# Patient Record
Sex: MALE | Race: ASIAN | NOT HISPANIC OR LATINO
[De-identification: names, ages, dates, MRNs, and addresses within clinical notes are randomized per-mention and may not be internally consistent; named-entity substitution may affect disease eponyms.]

---

## 2024-01-10 ENCOUNTER — APPOINTMENT (OUTPATIENT)
Dept: INTERNAL MEDICINE | Facility: CLINIC | Age: 44
End: 2024-01-10
Payer: COMMERCIAL

## 2024-01-10 ENCOUNTER — NON-APPOINTMENT (OUTPATIENT)
Age: 44
End: 2024-01-10

## 2024-01-10 ENCOUNTER — LABORATORY RESULT (OUTPATIENT)
Age: 44
End: 2024-01-10

## 2024-01-10 VITALS
WEIGHT: 184 LBS | HEART RATE: 89 BPM | HEIGHT: 71 IN | DIASTOLIC BLOOD PRESSURE: 80 MMHG | BODY MASS INDEX: 25.76 KG/M2 | OXYGEN SATURATION: 98 % | SYSTOLIC BLOOD PRESSURE: 120 MMHG

## 2024-01-10 DIAGNOSIS — Z23 ENCOUNTER FOR IMMUNIZATION: ICD-10-CM

## 2024-01-10 DIAGNOSIS — Z83.2 FAMILY HISTORY OF DISEASES OF THE BLOOD AND BLOOD-FORMING ORGANS AND CERTAIN DISORDERS INVOLVING THE IMMUNE MECHANISM: ICD-10-CM

## 2024-01-10 DIAGNOSIS — Z78.9 OTHER SPECIFIED HEALTH STATUS: ICD-10-CM

## 2024-01-10 DIAGNOSIS — Z00.00 ENCOUNTER FOR GENERAL ADULT MEDICAL EXAMINATION W/OUT ABNORMAL FINDINGS: ICD-10-CM

## 2024-01-10 DIAGNOSIS — Z12.12 ENCOUNTER FOR SCREENING FOR MALIGNANT NEOPLASM OF COLON: ICD-10-CM

## 2024-01-10 DIAGNOSIS — Z80.0 FAMILY HISTORY OF MALIGNANT NEOPLASM OF DIGESTIVE ORGANS: ICD-10-CM

## 2024-01-10 DIAGNOSIS — Z12.11 ENCOUNTER FOR SCREENING FOR MALIGNANT NEOPLASM OF COLON: ICD-10-CM

## 2024-01-10 DIAGNOSIS — Z80.3 FAMILY HISTORY OF MALIGNANT NEOPLASM OF BREAST: ICD-10-CM

## 2024-01-10 DIAGNOSIS — J39.2 OTHER DISEASES OF PHARYNX: ICD-10-CM

## 2024-01-10 PROCEDURE — 36415 COLL VENOUS BLD VENIPUNCTURE: CPT

## 2024-01-10 PROCEDURE — 99203 OFFICE O/P NEW LOW 30 MIN: CPT | Mod: 25

## 2024-01-10 PROCEDURE — 93000 ELECTROCARDIOGRAM COMPLETE: CPT | Mod: 59

## 2024-01-10 PROCEDURE — 90686 IIV4 VACC NO PRSV 0.5 ML IM: CPT

## 2024-01-10 PROCEDURE — G0008: CPT

## 2024-01-10 PROCEDURE — 99386 PREV VISIT NEW AGE 40-64: CPT | Mod: 25

## 2024-01-10 PROCEDURE — G0444 DEPRESSION SCREEN ANNUAL: CPT | Mod: 59

## 2024-01-10 NOTE — HEALTH RISK ASSESSMENT
[Little interest or pleasure doing things] : 1) Little interest or pleasure doing things [Feeling down, depressed, or hopeless] : 2) Feeling down, depressed, or hopeless [0] : 2) Feeling down, depressed, or hopeless: Not at all (0) [PHQ-2 Negative - No further assessment needed] : PHQ-2 Negative - No further assessment needed [[id number]] : 0

## 2024-01-10 NOTE — PHYSICAL EXAM
[Normal Sclera/Conjunctiva] : normal sclera/conjunctiva [PERRL] : pupils equal round and reactive to light [EOMI] : extraocular movements intact [Normal Outer Ear/Nose] : the outer ears and nose were normal in appearance [Normal Oropharynx] : the oropharynx was normal [No Lymphadenopathy] : no lymphadenopathy [Thyroid Normal, No Nodules] : the thyroid was normal and there were no nodules present [No Carotid Bruits] : no carotid bruits [No Abdominal Bruit] : a ~M bruit was not heard ~T in the abdomen [No Varicosities] : no varicosities [No Edema] : there was no peripheral edema [No Palpable Aorta] : no palpable aorta [Normal Appearance] : normal in appearance [No Masses] : no palpable masses [Penis Abnormality] : normal uncircumcised penis [Scrotum] : the scrotum was normal [Testes Tenderness] : no tenderness of the testes [Testes Mass (___cm)] : there were no testicular masses [Normal] : no posterior cervical lymphadenopathy and no anterior cervical lymphadenopathy [No CVA Tenderness] : no CVA  tenderness [No Spinal Tenderness] : no spinal tenderness [No Joint Swelling] : no joint swelling [No Rash] : no rash [Coordination Grossly Intact] : coordination grossly intact [No Focal Deficits] : no focal deficits [Normal Affect] : the affect was normal [Normal Insight/Judgement] : insight and judgment were intact

## 2024-01-10 NOTE — ASSESSMENT
[FreeTextEntry1] : Normal exam Check routine blood work Advised regular aerobic activity and heart healthy diet

## 2024-01-10 NOTE — HISTORY OF PRESENT ILLNESS
[de-identified] : Patient presents to office in order to establish care with a new PCP and for an annual wellness exam. He feels well and denies any chest pain, shortness of breath, palpitations.  He has enjoyed good health in his life. Does complain of some throat irritation after eating spicy food.  He denies any history of reflux

## 2024-01-11 DIAGNOSIS — R73.09 OTHER ABNORMAL GLUCOSE: ICD-10-CM

## 2024-01-11 LAB
ALBUMIN SERPL ELPH-MCNC: 4.8 G/DL
ALP BLD-CCNC: 90 U/L
ALT SERPL-CCNC: 29 U/L
ANION GAP SERPL CALC-SCNC: 16 MMOL/L
APPEARANCE: CLEAR
AST SERPL-CCNC: 19 U/L
BACTERIA: NEGATIVE /HPF
BASOPHILS # BLD AUTO: 0.03 K/UL
BASOPHILS NFR BLD AUTO: 0.5 %
BILIRUB SERPL-MCNC: 0.5 MG/DL
BILIRUBIN URINE: NEGATIVE
BLOOD URINE: NEGATIVE
BUN SERPL-MCNC: 20 MG/DL
CALCIUM SERPL-MCNC: 9.7 MG/DL
CAST: 0 /LPF
CHLORIDE SERPL-SCNC: 101 MMOL/L
CHOLEST SERPL-MCNC: 200 MG/DL
CO2 SERPL-SCNC: 21 MMOL/L
COLOR: YELLOW
CREAT SERPL-MCNC: 1.13 MG/DL
EGFR: 83 ML/MIN/1.73M2
EOSINOPHIL # BLD AUTO: 0.15 K/UL
EOSINOPHIL NFR BLD AUTO: 2.5 %
EPITHELIAL CELLS: 1 /HPF
GLUCOSE QUALITATIVE U: NEGATIVE MG/DL
GLUCOSE SERPL-MCNC: 108 MG/DL
HCT VFR BLD CALC: 45.7 %
HDLC SERPL-MCNC: 33 MG/DL
HGB BLD-MCNC: 15.3 G/DL
IMM GRANULOCYTES NFR BLD AUTO: 0.2 %
KETONES URINE: NEGATIVE MG/DL
LDLC SERPL CALC-MCNC: 131 MG/DL
LEUKOCYTE ESTERASE URINE: NEGATIVE
LYMPHOCYTES # BLD AUTO: 2.18 K/UL
LYMPHOCYTES NFR BLD AUTO: 35.6 %
MAN DIFF?: NORMAL
MCHC RBC-ENTMCNC: 28.4 PG
MCHC RBC-ENTMCNC: 33.5 GM/DL
MCV RBC AUTO: 84.9 FL
MICROSCOPIC-UA: NORMAL
MONOCYTES # BLD AUTO: 0.39 K/UL
MONOCYTES NFR BLD AUTO: 6.4 %
NEUTROPHILS # BLD AUTO: 3.36 K/UL
NEUTROPHILS NFR BLD AUTO: 54.8 %
NITRITE URINE: NEGATIVE
NONHDLC SERPL-MCNC: 167 MG/DL
PH URINE: 5.5
PLATELET # BLD AUTO: 250 K/UL
POTASSIUM SERPL-SCNC: 4.1 MMOL/L
PROT SERPL-MCNC: 7.4 G/DL
PROTEIN URINE: NORMAL MG/DL
PSA SERPL-MCNC: 1.99 NG/ML
RBC # BLD: 5.38 M/UL
RBC # FLD: 12.7 %
RED BLOOD CELLS URINE: 1 /HPF
SODIUM SERPL-SCNC: 138 MMOL/L
SPECIFIC GRAVITY URINE: >1.03
TRIGL SERPL-MCNC: 205 MG/DL
UROBILINOGEN URINE: 0.2 MG/DL
WBC # FLD AUTO: 6.12 K/UL
WHITE BLOOD CELLS URINE: 0 /HPF

## 2024-01-17 ENCOUNTER — LABORATORY RESULT (OUTPATIENT)
Age: 44
End: 2024-01-17

## 2024-01-26 ENCOUNTER — NON-APPOINTMENT (OUTPATIENT)
Age: 44
End: 2024-01-26

## 2024-01-26 ENCOUNTER — APPOINTMENT (OUTPATIENT)
Dept: CARDIOLOGY | Facility: CLINIC | Age: 44
End: 2024-01-26
Payer: COMMERCIAL

## 2024-01-26 VITALS
HEART RATE: 81 BPM | SYSTOLIC BLOOD PRESSURE: 116 MMHG | OXYGEN SATURATION: 96 % | DIASTOLIC BLOOD PRESSURE: 80 MMHG | HEIGHT: 71 IN | WEIGHT: 184 LBS | BODY MASS INDEX: 25.76 KG/M2

## 2024-01-26 DIAGNOSIS — R94.31 ABNORMAL ELECTROCARDIOGRAM [ECG] [EKG]: ICD-10-CM

## 2024-01-26 PROCEDURE — 99204 OFFICE O/P NEW MOD 45 MIN: CPT | Mod: 25

## 2024-01-26 PROCEDURE — G2211 COMPLEX E/M VISIT ADD ON: CPT

## 2024-01-26 PROCEDURE — 99401 PREV MED CNSL INDIV APPRX 15: CPT

## 2024-01-26 PROCEDURE — 93000 ELECTROCARDIOGRAM COMPLETE: CPT

## 2024-01-26 NOTE — DISCUSSION/SUMMARY
[FreeTextEntry1] : This is a 43 year old man with hyperlipidemia who is referred to the office for a cardiac evaluation.  He reports that he was told  he has nonspecific St and T changes on his EKG.  He reports no cardiac symptoms.  I am referring him for an echocardiogram to assess His cardiac structure and function, as well as an exercise stress test to assess His hemodynamic response to exercise, and to assess for the presence of obstructive coronary artery disease.  HIs BP is controlled.  His LDL is high and his HDL is low.  We discussed strategies to reduce his LDL and raise his HDL for greater than 15 minutes.  He needs to start exercising and be consistent with it.  WE will follow up with him regarding the above results.  He needs repeat blood work in 3-6 months.  He knows to call the office with any issues.   [EKG obtained to assist in diagnosis and management of assessed problem(s)] : EKG obtained to assist in diagnosis and management of assessed problem(s)

## 2024-01-26 NOTE — HISTORY OF PRESENT ILLNESS
[FreeTextEntry1] : This is a 43 year old man with hyperlipidemia who is referred to the office for a cardiac evaluation.  He reports that he was told  he has nonspecific St and T changes on his EKG.  He reports no cardiac symptoms, with no chest pain, increased dyspnea, PND, orthopnea, LE swelling, dizziness, or syncope.  He does not exercise consistently, and does not follow a specific diet.  HE has never seen a cardiologist or had a cardiac evaluation.   yes

## 2024-01-26 NOTE — PHYSICAL EXAM
[Well Developed] : well developed [Well Nourished] : well nourished [No Acute Distress] : no acute distress [Normal Conjunctiva] : normal conjunctiva [Normal Venous Pressure] : normal venous pressure [No Carotid Bruit] : no carotid bruit [Normal S1, S2] : normal S1, S2 [No Murmur] : no murmur [No Rub] : no rub [No Gallop] : no gallop [Clear Lung Fields] : clear lung fields [Good Air Entry] : good air entry [No Respiratory Distress] : no respiratory distress  [Soft] : abdomen soft [Non Tender] : non-tender [No Masses/organomegaly] : no masses/organomegaly [Normal Bowel Sounds] : normal bowel sounds [Normal Gait] : normal gait [No Cyanosis] : no cyanosis [No Edema] : no edema [No Varicosities] : no varicosities [No Clubbing] : no clubbing [No Rash] : no rash [No Skin Lesions] : no skin lesions [Moves all extremities] : moves all extremities [No Focal Deficits] : no focal deficits [Normal Speech] : normal speech [Alert and Oriented] : alert and oriented [Normal memory] : normal memory

## 2024-01-31 ENCOUNTER — NON-APPOINTMENT (OUTPATIENT)
Age: 44
End: 2024-01-31

## 2024-01-31 ENCOUNTER — APPOINTMENT (OUTPATIENT)
Dept: OTOLARYNGOLOGY | Facility: CLINIC | Age: 44
End: 2024-01-31
Payer: COMMERCIAL

## 2024-01-31 VITALS — SYSTOLIC BLOOD PRESSURE: 121 MMHG | HEART RATE: 105 BPM | DIASTOLIC BLOOD PRESSURE: 83 MMHG

## 2024-01-31 VITALS
HEIGHT: 71 IN | WEIGHT: 184 LBS | SYSTOLIC BLOOD PRESSURE: 143 MMHG | DIASTOLIC BLOOD PRESSURE: 91 MMHG | BODY MASS INDEX: 25.76 KG/M2

## 2024-01-31 PROCEDURE — 99203 OFFICE O/P NEW LOW 30 MIN: CPT

## 2024-01-31 NOTE — REVIEW OF SYSTEMS
[Problem Snoring] : problem snoring [Snoring With Pauses] : snoring with pauses [Throat Clearing] : throat clearing [Cough] : cough [Negative] : Heme/Lymph [de-identified] : rash

## 2024-01-31 NOTE — HISTORY OF PRESENT ILLNESS
[de-identified] : Sanchez Short is a 42 yo male who was referred by Dr. Doyle for evaluation of throat irritation. Before this month, he notes chronic issues with globus sensation when eating spicy foods. He notes having to sleep on incline. However, for the past month, this resolved after decreasing spicy food intake. He has been on reflux medication before. He denies dysphagia, odynophagia, dyspnea, dysphonia, heartburn, aspiration, food regurgitation. He notes that today, he has sore throat and cough. He notes low grade temperature at home today.

## 2024-01-31 NOTE — CONSULT LETTER
[Dear  ___] : Dear  [unfilled], [Consult Letter:] : I had the pleasure of evaluating your patient, [unfilled]. [Please see my note below.] : Please see my note below. [Consult Closing:] : Thank you very much for allowing me to participate in the care of this patient.  If you have any questions, please do not hesitate to contact me. [Sincerely,] : Sincerely, [FreeTextEntry3] : Naun Gardner MD

## 2024-01-31 NOTE — ASSESSMENT
[FreeTextEntry1] : Sanchez Short presents for evaluation. He has chronic issues with globus sensation after eating spicy foods. He notes that this has improved after decreasing spicy food intake. History is consistent with laryngopharyngeal reflux. He has acute URI symptoms right now and flexible laryngoscopy was deferred. He will test himself for Covid at home. Can perform flexible laryngoscopy at next visit.  - Start antireflux precautions - handout provided. - f/u in 1 mo.

## 2024-02-15 ENCOUNTER — APPOINTMENT (OUTPATIENT)
Dept: CARDIOLOGY | Facility: CLINIC | Age: 44
End: 2024-02-15
Payer: COMMERCIAL

## 2024-02-15 PROCEDURE — 93306 TTE W/DOPPLER COMPLETE: CPT

## 2024-02-28 ENCOUNTER — APPOINTMENT (OUTPATIENT)
Dept: OTOLARYNGOLOGY | Facility: CLINIC | Age: 44
End: 2024-02-28
Payer: COMMERCIAL

## 2024-02-28 VITALS
DIASTOLIC BLOOD PRESSURE: 74 MMHG | HEIGHT: 71 IN | BODY MASS INDEX: 26.04 KG/M2 | HEART RATE: 86 BPM | SYSTOLIC BLOOD PRESSURE: 107 MMHG | WEIGHT: 186 LBS

## 2024-02-28 PROCEDURE — 31575 DIAGNOSTIC LARYNGOSCOPY: CPT

## 2024-02-28 PROCEDURE — 99213 OFFICE O/P EST LOW 20 MIN: CPT | Mod: 25

## 2024-02-28 NOTE — HISTORY OF PRESENT ILLNESS
[de-identified] : Sanchez Short is a 42 yo male who was referred by Dr. Doyle for evaluation of throat irritation. Before this month, he notes chronic issues with globus sensation when eating spicy foods. He notes having to sleep on incline. However, for the past month, this resolved after decreasing spicy food intake. He has been on reflux medication before. He denies dysphagia, odynophagia, dyspnea, dysphonia, heartburn, aspiration, food regurgitation. He notes that today, he has sore throat and cough. He notes low grade temperature at home today. [FreeTextEntry1] : 2/28/24 - Mr. Short presents for follow-up. He has implemented antireflux precautions. He notes intermittent throat irritation and globus sensation. He denies dysphagia, odynophagia, dyspnea, heartburn, dysphonia. No fevers or chills.

## 2024-02-28 NOTE — ASSESSMENT
[FreeTextEntry1] : Sanchez Short presents for follow-up. He notes continued issues with globus sensation despite antireflux precautions. Flexible laryngoscopy was performed today showing postcricoid inflammation and mild erythema of vocal cords consistent with laryngopharyngeal reflux. We will start omeprazole and continue antireflux precautions.  - Start omeprazole 20 mg qd. Risks of PPI use were discussed including but not limited to osteoporosis, GI distress, pulmonary issues. - Continue antireflux precautions. - f/u in 3 mo.

## 2024-03-05 ENCOUNTER — APPOINTMENT (OUTPATIENT)
Dept: CARDIOLOGY | Facility: CLINIC | Age: 44
End: 2024-03-05
Payer: COMMERCIAL

## 2024-03-05 PROCEDURE — 93015 CV STRESS TEST SUPVJ I&R: CPT

## 2024-03-07 ENCOUNTER — APPOINTMENT (OUTPATIENT)
Dept: GASTROENTEROLOGY | Facility: CLINIC | Age: 44
End: 2024-03-07
Payer: COMMERCIAL

## 2024-03-07 VITALS
OXYGEN SATURATION: 97 % | DIASTOLIC BLOOD PRESSURE: 80 MMHG | SYSTOLIC BLOOD PRESSURE: 116 MMHG | WEIGHT: 186 LBS | BODY MASS INDEX: 26.04 KG/M2 | HEART RATE: 91 BPM | HEIGHT: 71 IN

## 2024-03-07 DIAGNOSIS — Z12.11 ENCOUNTER FOR SCREENING FOR MALIGNANT NEOPLASM OF COLON: ICD-10-CM

## 2024-03-07 DIAGNOSIS — Z12.12 ENCOUNTER FOR SCREENING FOR MALIGNANT NEOPLASM OF COLON: ICD-10-CM

## 2024-03-07 PROCEDURE — 99204 OFFICE O/P NEW MOD 45 MIN: CPT

## 2024-03-07 RX ORDER — SODIUM SULFATE, POTASSIUM SULFATE AND MAGNESIUM SULFATE 1.6; 3.13; 17.5 G/177ML; G/177ML; G/177ML
17.5-3.13-1.6 SOLUTION ORAL
Qty: 1 | Refills: 0 | Status: ACTIVE | COMMUNITY
Start: 2024-03-07 | End: 1900-01-01

## 2024-03-07 NOTE — PHYSICAL EXAM
[Bowel Sounds] : normal bowel sounds [Abdomen Tenderness] : non-tender [No Masses] : no abdominal mass palpated [Abdomen Soft] : soft [] : no hepatosplenomegaly [Cervical Lymph Nodes Enlarged Posterior Bilaterally] : no posterior cervical lymphadenopathy [Abnormal Walk] : normal gait [Supraclavicular Lymph Nodes Enlarged Bilaterally] : no supraclavicular lymphadenopathy [No Clubbing, Cyanosis] : no clubbing or cyanosis of the fingernails [Normal Color / Pigmentation] : normal skin color and pigmentation [Normal] : oriented to person, place, and time

## 2024-03-07 NOTE — HISTORY OF PRESENT ILLNESS
[FreeTextEntry1] : 44 y/o father of two, without any major medical problems who presents for a screening colonoscopy.   Patient denies having abdominal pain, constipation, diarrhea, loss of appetite, weight loss, melena and hematochezia.  He says belching, "choking in throat" - "dry strips" in throat - since 6 months.   Patient denies having heartburn, regurgitation, difficulty swallowing, painful swallowing, nausea, vomiting.   Had laryngoscopy by ENT last week - was told "redness in throat" perhaps due to acid reflux - omeprazole for 3 months - started a few days ago.  Never had an upper endoscopy.  Family history of malignant neoplasm of colon: Maternal Grandmother at 85/90.  Patient denies family history of colon polyp and gastrointestinal cancers.  All other review of systems are negative.  Denies cardiac symptoms.

## 2024-03-07 NOTE — ASSESSMENT
[FreeTextEntry1] : IMPRESSION: #  Due for a screening test for colorectal cancer -  FIT negative on 1/2024  -  Never had a colonoscopy  #  Family history of malignant neoplasm of colon:  -  Maternal Grandmother at 85/90 -  Mother never colonoscopy   #  Extraesophageal symptoms  -  He says belching, "choking in throat" - "dry strips" in throat - since 6 months. -  Had laryngoscopy by ENT last week - was told "redness in throat" perhaps due to acid reflux - omeprazole for 3 months - started a few days ago.   PLAN We discussed colorectal cancer in the [place].   Colorectal cancer is the 3rd most common cause of cancer for men and woman, 2nd most common cause of cancer related deaths in the [place].  By 2030, colorectal cancer will be the leading cause of cancer related death in age 20 to 49.   We reviewed risk factors for colon cancer which include age, personal history of colon polyps, having a family Hx of colon cancer, cancer syndromes, personal Hx of inflammatory disease, having comorbidities such as obesity, having nicotine addiction, alcohol addiction,etc.   Screening for colorectal cancer begins at age 45 for average risk individuals (and earlier for high risk groups).   This patient is AVERAGE RISK for colorectal cancer (patient without a history of advanced adenomas or 3 or more adenomas, colorectal cancer, family history of colorectal cancer, inflammatory bowel disease, and known genetic predisposition to colorectal cancer).     We reviewed the screening tests for colorectal cancer detection and prevention.  We discussed screening tests such as stool based strategies, direct visualization techniques such as Virtual (CT) colonography, flexible sigmoidoscopy, and colonoscopy.    Patient was made aware that despite these screening test, a lesion can still be missed.    Certain precancerous lesions such as sessile serrated lesions are more difficult to detect on screening exams than the conventional, precancerous lesion, adenomas.  Colonoscopy offers the best chance at detecting sessile serrated lesions.  Stool DNA tests detect only a fraction of large sessile serrated lesions, FIT detect sessile serrated lesions poorly because sessile serrated lesions rarely bleed even if large.  CT colonography does not detect sessile serrated lesions due to its flat morphology.  Sigmoidoscopy does not examine the proximal colon where most of sessile serrated lesions reside.  Because patient's mother never had a colonoscopy, I have advised the patient to arrange for a colonoscopy to rule out colon polyps, colorectal cancer etc. under monitored anesthesia care.  Risks such as perforation requiring surgery, colostomy, bleeding requiring blood transfusion, infection/sepsis, diverticulitis, colitis, missed colon cancer (2% to 6%), internal organ injury such as splenic hemorrhage (1/6000, risk thin, female gender) etc, adverse reaction to medication etc. and risks of anesthesia including cardiopulmonary compromise requiring ICU care were discussed with patient.  Alternatives were discussed (CT colonography, stool test etc).  Patient verbalized understanding and agrees to proceed with a colonoscopy under anesthesia.  TO rule out GERD, best to consider EGD with BRAVO - risks/benefits/alternatives reviewed - he will think about it - contact information given.

## 2024-05-13 ENCOUNTER — APPOINTMENT (OUTPATIENT)
Dept: GASTROENTEROLOGY | Facility: HOSPITAL | Age: 44
End: 2024-05-13

## 2024-05-28 ENCOUNTER — RX RENEWAL (OUTPATIENT)
Age: 44
End: 2024-05-28

## 2024-05-29 ENCOUNTER — APPOINTMENT (OUTPATIENT)
Dept: OTOLARYNGOLOGY | Facility: CLINIC | Age: 44
End: 2024-05-29
Payer: COMMERCIAL

## 2024-05-29 VITALS
BODY MASS INDEX: 27.02 KG/M2 | SYSTOLIC BLOOD PRESSURE: 122 MMHG | HEIGHT: 71 IN | WEIGHT: 193 LBS | DIASTOLIC BLOOD PRESSURE: 88 MMHG | HEART RATE: 80 BPM

## 2024-05-29 DIAGNOSIS — R09.A2 FOREIGN BODY SENSATION, THROAT: ICD-10-CM

## 2024-05-29 DIAGNOSIS — K21.9 GASTRO-ESOPHAGEAL REFLUX DISEASE W/OUT ESOPHAGITIS: ICD-10-CM

## 2024-05-29 PROCEDURE — 99213 OFFICE O/P EST LOW 20 MIN: CPT | Mod: 25

## 2024-05-29 PROCEDURE — 31575 DIAGNOSTIC LARYNGOSCOPY: CPT

## 2024-05-29 RX ORDER — FAMOTIDINE 20 MG/1
20 TABLET, FILM COATED ORAL
Qty: 90 | Refills: 0 | Status: ACTIVE | COMMUNITY
Start: 2024-05-29 | End: 1900-01-01

## 2024-05-29 RX ORDER — OMEPRAZOLE 20 MG/1
20 CAPSULE, DELAYED RELEASE ORAL
Qty: 90 | Refills: 0 | Status: ACTIVE | COMMUNITY
Start: 2024-02-28 | End: 1900-01-01

## 2024-05-29 NOTE — ASSESSMENT
[FreeTextEntry1] : Sanchez Short presents for follow-up. He has laryngopharyngeal reflux and is on omeprazole and antireflux precautions. Flexible laryngoscopy today shows mild erythema of bilateral vocal cords and postcricoid inflammation. Will add famotidine to regimen. He will see GI again for possible upper endoscopy.  - Continue omeprazole 20 mg qd. Risks of PPI use were discussed including but not limited to osteoporosis, GI distress, pulmonary issues. - Start famotidine 20 mg qhs. - Continue antireflux precautions. - Pt to see GI again - f/u in 3 mo.

## 2024-05-29 NOTE — HISTORY OF PRESENT ILLNESS
[de-identified] : Sanchez Short is a 42 yo male who was referred by Dr. Doyle for evaluation of throat irritation. Before this month, he notes chronic issues with globus sensation when eating spicy foods. He notes having to sleep on incline. However, for the past month, this resolved after decreasing spicy food intake. He has been on reflux medication before. He denies dysphagia, odynophagia, dyspnea, dysphonia, heartburn, aspiration, food regurgitation. He notes that today, he has sore throat and cough. He notes low grade temperature at home today. [FreeTextEntry1] : 2/28/24 - Mr. Short presents for follow-up. He has implemented antireflux precautions. He notes intermittent throat irritation and globus sensation. He denies dysphagia, odynophagia, dyspnea, heartburn, dysphonia. No fevers or chills.  5/29/24 - Mr. Short presents for follow-up. He notes continued but improved globus sensation. He feels his voice changes when turning his head is talking. He denies dyspnea, dysphagia, dysphonia, fevers.

## 2024-07-15 ENCOUNTER — APPOINTMENT (OUTPATIENT)
Dept: INTERNAL MEDICINE | Facility: CLINIC | Age: 44
End: 2024-07-15
Payer: COMMERCIAL

## 2024-07-15 VITALS
HEART RATE: 92 BPM | DIASTOLIC BLOOD PRESSURE: 80 MMHG | OXYGEN SATURATION: 98 % | WEIGHT: 189 LBS | SYSTOLIC BLOOD PRESSURE: 100 MMHG | BODY MASS INDEX: 26.36 KG/M2

## 2024-07-15 DIAGNOSIS — R73.09 OTHER ABNORMAL GLUCOSE: ICD-10-CM

## 2024-07-15 DIAGNOSIS — Z12.11 ENCOUNTER FOR SCREENING FOR MALIGNANT NEOPLASM OF COLON: ICD-10-CM

## 2024-07-15 DIAGNOSIS — R94.31 ABNORMAL ELECTROCARDIOGRAM [ECG] [EKG]: ICD-10-CM

## 2024-07-15 DIAGNOSIS — J39.2 OTHER DISEASES OF PHARYNX: ICD-10-CM

## 2024-07-15 DIAGNOSIS — Z12.12 ENCOUNTER FOR SCREENING FOR MALIGNANT NEOPLASM OF COLON: ICD-10-CM

## 2024-07-15 DIAGNOSIS — E78.5 HYPERLIPIDEMIA, UNSPECIFIED: ICD-10-CM

## 2024-07-15 PROCEDURE — 99214 OFFICE O/P EST MOD 30 MIN: CPT

## 2024-07-15 PROCEDURE — 36415 COLL VENOUS BLD VENIPUNCTURE: CPT

## 2024-07-19 LAB
ALBUMIN SERPL ELPH-MCNC: 4.4 G/DL
ALP BLD-CCNC: 83 U/L
ALT SERPL-CCNC: 37 U/L
ANION GAP SERPL CALC-SCNC: 13 MMOL/L
AST SERPL-CCNC: 26 U/L
BILIRUB SERPL-MCNC: 0.3 MG/DL
BUN SERPL-MCNC: 16 MG/DL
CALCIUM SERPL-MCNC: 9.3 MG/DL
CHLORIDE SERPL-SCNC: 105 MMOL/L
CHOLEST SERPL-MCNC: 196 MG/DL
CO2 SERPL-SCNC: 21 MMOL/L
CREAT SERPL-MCNC: 1.22 MG/DL
EGFR: 75 ML/MIN/1.73M2
ESTIMATED AVERAGE GLUCOSE: 126 MG/DL
GLUCOSE SERPL-MCNC: 102 MG/DL
GLUCOSE SERPL-MCNC: 110 MG/DL
HBA1C MFR BLD HPLC: 6 %
HDLC SERPL-MCNC: 32 MG/DL
LDLC SERPL CALC-MCNC: 119 MG/DL
NONHDLC SERPL-MCNC: 164 MG/DL
POTASSIUM SERPL-SCNC: 4.1 MMOL/L
PROT SERPL-MCNC: 7.1 G/DL
SODIUM SERPL-SCNC: 138 MMOL/L
TRIGL SERPL-MCNC: 257 MG/DL

## 2024-08-28 ENCOUNTER — APPOINTMENT (OUTPATIENT)
Dept: OTOLARYNGOLOGY | Facility: CLINIC | Age: 44
End: 2024-08-28